# Patient Record
Sex: MALE | Race: WHITE | Employment: FULL TIME | URBAN - NONMETROPOLITAN AREA
[De-identification: names, ages, dates, MRNs, and addresses within clinical notes are randomized per-mention and may not be internally consistent; named-entity substitution may affect disease eponyms.]

---

## 2017-08-02 ENCOUNTER — OFFICE VISIT (OUTPATIENT)
Dept: FAMILY MEDICINE | Facility: CLINIC | Age: 47
End: 2017-08-02
Payer: COMMERCIAL

## 2017-08-02 ENCOUNTER — RADIANT APPOINTMENT (OUTPATIENT)
Dept: GENERAL RADIOLOGY | Facility: CLINIC | Age: 47
End: 2017-08-02
Attending: NURSE PRACTITIONER
Payer: COMMERCIAL

## 2017-08-02 VITALS
WEIGHT: 178.8 LBS | HEIGHT: 69 IN | RESPIRATION RATE: 18 BRPM | DIASTOLIC BLOOD PRESSURE: 74 MMHG | TEMPERATURE: 96.7 F | BODY MASS INDEX: 26.48 KG/M2 | SYSTOLIC BLOOD PRESSURE: 124 MMHG | OXYGEN SATURATION: 99 % | HEART RATE: 67 BPM

## 2017-08-02 DIAGNOSIS — Z23 NEED FOR TDAP VACCINATION: ICD-10-CM

## 2017-08-02 DIAGNOSIS — M25.511 PAIN OF RIGHT SHOULDER JOINT ON MOVEMENT: ICD-10-CM

## 2017-08-02 DIAGNOSIS — M25.511 PAIN OF RIGHT SHOULDER JOINT ON MOVEMENT: Primary | ICD-10-CM

## 2017-08-02 PROCEDURE — 90471 IMMUNIZATION ADMIN: CPT | Performed by: NURSE PRACTITIONER

## 2017-08-02 PROCEDURE — 99213 OFFICE O/P EST LOW 20 MIN: CPT | Mod: 25 | Performed by: NURSE PRACTITIONER

## 2017-08-02 PROCEDURE — 73030 X-RAY EXAM OF SHOULDER: CPT | Mod: RT

## 2017-08-02 PROCEDURE — 90715 TDAP VACCINE 7 YRS/> IM: CPT | Performed by: NURSE PRACTITIONER

## 2017-08-02 ASSESSMENT — PAIN SCALES - GENERAL: PAINLEVEL: WORST PAIN (10)

## 2017-08-02 NOTE — PATIENT INSTRUCTIONS
We will call you with the results of your X rays    Ice to shoulder 20 minutes, several times daily    Ibuprofen 600 mg orally every 6 hours as needed for pain.    Try Icy -Hot or Aspercream to shoulder. If no pain relief, then try the Diclofenac gel.        Your clinic record indicates that you are due for:  complete physical exam and fasting labs  Need to be fasting for 10 hrs so just coming in before eating breakfast is the easiest.

## 2017-08-02 NOTE — PROGRESS NOTES
"  SUBJECTIVE:                                                    Jacques Morales is a 46 year old male who presents to clinic today for the following health issues:      Musculoskeletal problem/pain      Duration: 4 weeks     Description  Location: right    Intensity:  Severe pain after working    Accompanying signs and symptoms: none    History  Previous similar problem: no   Previous evaluation:  none    Precipitating or alleviating factors:  Trauma or overuse: no   Aggravating factors include: lifting and overuse    Therapies tried and outcome: NSAID - IBU was not helpful    Had been present for about a month but worsened 6 days ago.   One week ago took out and down to bed. Started hurting a few hours before finishing.  Pain Has improved some since last thurs, pain remains  Favors right arm  Can't sleep on right side.   Localized to shoulder, no neuropathy or myalgia   Ibuprofen helped a little. No tylenol, no ice or heat tried.  Has pain with rotation.      Problem list and histories reviewed & adjusted, as indicated.  Additional history: as documented    Reviewed and updated as needed this visit by clinical staffTobacco  Allergies  Meds  Problems       Reviewed and updated as needed this visit by Provider  Allergies  Meds  Problems         ROS:  Constitutional, HEENT, cardiovascular, pulmonary, gi and gu systems are negative, except as otherwise noted.      OBJECTIVE:   /74 (BP Location: Left arm, Patient Position: Chair, Cuff Size: Adult Regular)  Pulse 67  Temp 96.7  F (35.9  C) (Tympanic)  Resp 18  Ht 5' 9\" (1.753 m)  Wt 178 lb 12.8 oz (81.1 kg)  SpO2 99%  BMI 26.4 kg/m2  Body mass index is 26.4 kg/(m^2).  GENERAL: healthy, alert and no distress  NECK: no adenopathy, no asymmetry, masses, or scars and thyroid normal to palpation  RESP: lungs clear to auscultation - no rales, rhonchi or wheezes  CV: regular rate and rhythm, normal S1 S2, no S3 or S4, no murmur, click or rub, no " peripheral edema and peripheral pulses strong  MS: decreased range of motion right shoulder and tenderness to palpation right shoulder. Pain with posterior rotation  Diagnostic Test Results:  none     ASSESSMENT/PLAN:     (M25.462) Pain of right shoulder joint on movement  Comment: We'll obtain film and refer to physical therapy.  Plan: XR Shoulder Right G/E 3 Views, diclofenac         (VOLTAREN) 1 % GEL topical gel, PHYSICAL         THERAPY REFERRAL            (Z23) Need for Tdap vaccination  (primary encounter diagnosis)  Comment:   Plan: TDAP VACCINE (ADACEL), ADMIN 1st VACCINE            Patient Instructions   We will call you with the results of your X rays    Ice to shoulder 20 minutes, several times daily    Ibuprofen 600 mg orally every 6 hours as needed for pain.    Try Icy -Hot or Aspercream to shoulder. If no pain relief, then try the Diclofenac gel.        Your clinic record indicates that you are due for:  complete physical exam and fasting labs  Need to be fasting for 10 hrs so just coming in before eating breakfast is the easiest.         Breanna Buenrostro, MAGUI, APRN CNP  [facility] [place]

## 2017-08-02 NOTE — MR AVS SNAPSHOT
After Visit Summary   8/2/2017    Jacques Morales    MRN: [id number]           Patient Information     Date Of Birth          1970        Visit Information        Provider Department      8/2/2017 7:40 AM Breanna Buenrostro APRN [facility]        Today's Diagnoses     Need for Tdap vaccination    -  1    Pain of right shoulder joint on movement          Care Instructions    We will call you with the results of your X rays    Ice to shoulder 20 minutes, several times daily    Ibuprofen 600 mg orally every 6 hours as needed for pain.    Try Icy -Hot or Aspercream to shoulder. If no pain relief, then try the Diclofenac gel.        Your clinic record indicates that you are due for:  complete physical exam and fasting labs  Need to be fasting for 10 hrs so just coming in before eating breakfast is the easiest.             Follow-ups after your visit        Additional Services     PHYSICAL THERAPY REFERRAL       *This order will print in the [facility] Central Scheduling Office*    [facility] provides Physical Therapy evaluation and treatment and many specialty services across the [facility] system.  If requesting a specialty program, please choose from the list below.    Call one number to schedule at any [facility] location   [phone].    Treatment: Evaluation & Treatment  Special Instructions/Modalities: none. Patient with right shoulder pain, no known injury. X ray negative for fracture. ROM limited.   Special Programs: None    Please be aware that coverage of these services is subject to the terms and limitations of your health insurance plan.  Call member services at your health plan with any benefit or coverage questions.      **Note to Provider** To refer patients to therapy outside of the location list, change the order class to External Referral in the order composer.                  Who to contact     If  "you have questions or need follow up information about today's clinic visit or your schedule please contact [facility] [place] directly at [phone].  Normal or non-critical lab and imaging results will be communicated to you by MyChart, letter or phone within 4 business days after the clinic has received the results. If you do not hear from us within 7 days, please contact the clinic through [employer] or phone. If you have a critical or abnormal lab result, we will notify you by phone as soon as possible.  Submit refill requests through [employer] or call your pharmacy and they will forward the refill request to us. Please allow 3 business days for your refill to be completed.          Additional Information About Your Visit        [employer] Information     [employer] lets you send messages to your doctor, view your test results, renew your prescriptions, schedule appointments and more. To sign up, go to [email or web address] . Click on \"Log in\" on the left side of the screen, which will take you to the Welcome page. Then click on \"Sign up Now\" on the right side of the page.     You will be asked to enter the access code listed below, as well as some personal information. Please follow the directions to create your username and password.     Your access code is: [email or web address]  Expires: 10/31/2017  8:59 AM     Your access code will  in 90 days. If you need help or a new code, please call your [facility] clinic or [phone].        Care EveryWhere ID     This is your Care EveryWhere ID. This could be used by other organizations to access your [facility] medical records  [id number]        Your Vitals Were     Pulse Temperature Respirations Height Pulse Oximetry BMI (Body Mass Index)    67 96.7  F (35.9  C) (Tympanic) 18 5' 9\" (1.753 m) 99% 26.4 kg/m2       Blood Pressure from Last 3 Encounters:   17 124/74   16 130/78   14 108/70    Weight from Last 3 Encounters:   17 178 lb 12.8 oz " (81.1 kg)   09/08/16 180 lb (81.6 kg)   02/06/14 186 lb (84.4 kg)              We Performed the Following     ADMIN 1st VACCINE     PHYSICAL THERAPY REFERRAL     TDAP VACCINE (ADACEL)          Today's Medication Changes          These changes are accurate as of: 8/2/17  9:00 AM.  If you have any questions, ask your nurse or doctor.               Start taking these medicines.        Dose/Directions    diclofenac 1 % Gel topical gel   Commonly known as:  VOLTAREN   Used for:  Pain of right shoulder joint on movement   Started by:  Breanna Buenrostro APRN CNP        Apply 4 grams to right shoulder four times daily using enclosed dosing card.   Quantity:  100 g   Refills:  1            Where to get your medicines      These medications were sent to [facility] [address] [address]     Phone:  [phone]     diclofenac 1 % Gel topical gel                Primary Care Provider Office Phone # Fax #    SHITAL Bang [phone]       [facility] [address] [place] [address]        Equal Access to Services     MILTON SHARPE : Hadii aad ku hadasho Soomaali, waaxda luqadaha, qaybta kaalmada adeegyada, waxay sita sanchez . So [facility] [phone].    ATENCIÓN: Si habla español, tiene a orona disposición servicios gratuitos de asistencia lingüística. Tanisha al [phone].    We comply with applicable federal civil rights laws and [place] laws. We do not discriminate on the basis of race, color, national origin, age, disability sex, sexual orientation or gender identity.            Thank you!     Thank you for choosing [facility] [place]  for your care. Our goal is always to provide you with excellent care. Hearing back from our patients is one way we can continue to improve our services. Please take a few minutes to complete the written survey that you may receive in the mail after your  visit with us. Thank you!             Your Updated Medication List - Protect others around you: Learn how to safely use, store and throw away your medicines at www.disposemymeds.org.          This list is accurate as of: 8/2/17  9:00 AM.  Always use your most recent med list.                   Brand Name Dispense Instructions for use Diagnosis    diclofenac 1 % Gel topical gel    VOLTAREN    100 g    Apply 4 grams to right shoulder four times daily using enclosed dosing card.    Pain of right shoulder joint on movement

## 2017-08-02 NOTE — NURSING NOTE
"Chief Complaint   Patient presents with     Shoulder Pain       Initial There were no vitals taken for this visit. Estimated body mass index is 26.2 kg/(m^2) as calculated from the following:    Height as of 9/8/16: 5' 9.5\" (1.765 m).    Weight as of 9/8/16: 180 lb (81.6 kg).  Medication Reconciliation: complete      Health Maintenance that is potentially due pending provider review:  NONE    n/a  "

## 2018-05-19 ENCOUNTER — HOSPITAL ENCOUNTER (EMERGENCY)
Facility: CLINIC | Age: 48
Discharge: HOME | End: 2018-05-19
Attending: EMERGENCY MEDICINE | Admitting: EMERGENCY MEDICINE
Payer: COMMERCIAL

## 2018-05-19 VITALS
HEIGHT: 70 IN | SYSTOLIC BLOOD PRESSURE: 128 MMHG | BODY MASS INDEX: 26.48 KG/M2 | TEMPERATURE: 98.9 F | WEIGHT: 185 LBS | HEART RATE: 86 BPM | RESPIRATION RATE: 18 BRPM | OXYGEN SATURATION: 98 % | DIASTOLIC BLOOD PRESSURE: 85 MMHG

## 2018-05-19 DIAGNOSIS — S69.92XA FISH HOOK INJURY OF FINGER OF LEFT HAND, INITIAL ENCOUNTER: ICD-10-CM

## 2018-05-19 PROCEDURE — 99283 EMERGENCY DEPT VISIT LOW MDM: CPT | Mod: 25 | Performed by: EMERGENCY MEDICINE

## 2018-05-19 PROCEDURE — 10120 INC&RMVL FB SUBQ TISS SMPL: CPT | Mod: Z6 | Performed by: EMERGENCY MEDICINE

## 2018-05-19 PROCEDURE — 10120 INC&RMVL FB SUBQ TISS SMPL: CPT | Performed by: EMERGENCY MEDICINE

## 2018-05-19 PROCEDURE — 99282 EMERGENCY DEPT VISIT SF MDM: CPT | Mod: 25 | Performed by: EMERGENCY MEDICINE

## 2018-05-19 ASSESSMENT — ENCOUNTER SYMPTOMS: WOUND: 1

## 2018-05-19 NOTE — ED PROVIDER NOTES
"  History     Chief Complaint   Patient presents with     Foreign Body in Skin     fish hook L index finger.  tetanus UTD per pt     HPI  Jacques Morales is a 47 year old male who presents to the ED for evaluation of foreign body in the skin. Patient was fishing this morning and got a fish hook stuck in his left index finger at the cuticle. Patient cut the other hooks off of the one stuck in his finger and attempted to pull the hook out. He states that he pulled on the hook relatively hard and was unable to get the hook out. His TDAP is up to date. He has no known drug allergies.     Problem List:    Patient Active Problem List    Diagnosis Date Noted     Plantar fasciitis 09/08/2016     Priority: Medium     CARDIOVASCULAR SCREENING; LDL GOAL LESS THAN 160 10/31/2010     Priority: Medium        Past Medical History:    No past medical history on file.    Past Surgical History:    No past surgical history on file.    Family History:    No family history on file.    Social History:  Marital Status:  Single [1]  Social History   Substance Use Topics     Smoking status: Current Every Day Smoker     Packs/day: 1.00     Types: Cigarettes     Smokeless tobacco: Never Used     Alcohol use Yes      Comment: rare        Medications:      diclofenac (VOLTAREN) 1 % GEL topical gel         Review of Systems   Skin: Positive for wound.   All other systems reviewed and are negative.      Physical Exam   BP: 128/85  Pulse: 86  Temp: 98.9  F (37.2  C)  Resp: 18  Height: 177.8 cm (5' 10\")  Weight: 83.9 kg (185 lb)  SpO2: 98 %      Physical Exam    /85  Pulse 86  Temp 98.9  F (37.2  C) (Temporal)  Resp 18  Ht 1.778 m (5' 10\")  Wt 83.9 kg (185 lb)  SpO2 98%  BMI 26.54 kg/m2  /85  Pulse 86  Temp 98.9  F (37.2  C) (Temporal)  Resp 18  Ht 1.778 m (5' 10\")  Wt 83.9 kg (185 lb)  SpO2 98%  BMI 26.54 kg/m2  General: alert and in no acute distress  Head: atraumatic, normocephalic  Abd: Soft, nontender, nondistended, no " peritoneal signs  Left index finger with fishhook that is that the Musculoskel/Extremities: Base of the left fingernail, with a triple barbed lower, however to Flory's have been cut off by the patient prior to arrival.  Slight amounts of bleeding secondary to patient's previous attempts at removing the hook.  Neuro: Patient awake, alert, oriented, speech is fluent, gait is normal  Psychiatric: affect/mood normal, cooperative, normal judgement/insight and memory intact          ED Course     ED Course     Procedures               Critical Care time:  none               No results found for this or any previous visit (from the past 24 hour(s)).    Medications - No data to display    9:22 AM Patient assessed     Assessments & Plan (with Medical Decision Making)  47 year old male, presenting to the emergency department with fishhook injury to the base of the left fingernail.  This occurred shortly prior to arrival.  Patient reports his tetanus is up-to-date.  He attempted to remove the hook, however was unsuccessful.  Patient had significant pain with further attempts, and therefore presented to the emergency department.  Digital block was performed with 5 cc of 1% lidocaine without epinephrine, and patient had good anesthesia of the left second digit.  Subsequently, needle  was utilized and fishhook was removed from the area where the fissure cath inserted.  Patient tolerated procedure well.  Area was cleansed, and triple antibiotic ointment applied.  Patient counseled on wound care instructions, and follow-up in clinic or be seen if any signs of infection develop.     I have reviewed the nursing notes.    I have reviewed the findings, diagnosis, plan and need for follow up with the patient.       New Prescriptions    No medications on file       Final diagnoses:   Fish hook injury of finger of left hand, initial encounter     This document serves as a record of the services and decisions personally performed and  made by Danny Bryson MD. It was created on HIS/HER behalf by   Shi Crystal, a trained medical scribe. The creation of this document is based the provider's statements to the medical scribe.  Shi Crystal 9:22 AM 5/19/2018    Provider:   The information in this document, created by the medical scribe for me, accurately reflects the services I personally performed and the decisions made by me. I have reviewed and approved this document for accuracy prior to leaving the patient care area.  Danny Bryson MD 9:22 AM 5/19/2018 5/19/2018   [facility] EMERGENCY DEPARTMENT     Danny Bryson MD  05/19/18 1016

## 2018-05-19 NOTE — ED AVS SNAPSHOT
[facility] Emergency Department    [address] [facility] [address]    Phone:  [phone]    Fax:  [phone]                                       Jacques Morales   MRN: [id number]    Department:  [facility] Emergency Department   Date of Visit:  5/19/2018           After Visit Summary Signature Page     I have received my discharge instructions, and my questions have been answered. I have discussed any challenges I see with this plan with the nurse or doctor.    ..........................................................................................................................................  Patient/Patient Representative Signature      ..........................................................................................................................................  Patient Representative Print Name and Relationship to Patient    ..................................................               ................................................  Date                                            Time    ..........................................................................................................................................  Reviewed by Signature/Title    ...................................................              ..............................................  Date                                                            Time

## 2018-05-19 NOTE — ED AVS SNAPSHOT
[facility] Emergency Department    [address] [facility] [address] [facility] [address]    Phone:  [phone]    Fax:  [phone]                                       Jacques Morales   MRN: [id number]    Department:  [facility] Emergency Department   Date of Visit:  5/19/2018           Patient Information     Date Of Birth          1970        Your diagnoses for this visit were:     Fish hook injury of finger of left hand, initial encounter        You were seen by Danny Bryson MD.        Discharge Instructions       Recommend soaking the hand/finger at least a couple times daily.  Apply antibiotic ointment twice daily.    Be seen if signs of infection develop including pus, or spreading redness of the finger.      Fish Hook Removed  A fish hook has been removed from under your skin. This area may be sore for the next 1 to 2 days. Because this was a dirty puncture-type wound, the risk of infection is higher than normal. Antibiotics may or may not be prescribed depending on various factors such as depth, severity, and location. You may be given a tetanus shot if needed.  Home care  Your healthcare provider will give you instructions on how to care for your wound. These will depend on where the wound is and how serious it is. The following may help you care for your wound at home:    Keep the injured part elevated during the first 2 days. This will help reduce swelling and pain.    Keep the wound clean and dry. If a bandage was applied and it becomes wet or dirty, replace it. Otherwise, leave it in place for the first 24 hours.    Shower as usual, unless your healthcare provider tells you otherwise.    Don't soak in a tub or go swimming for at least 1 week or as instructed by your doctor.    Don't soak the injured area unless your doctor tells you to.    Use acetaminophen or ibuprofen to control pain, unless another pain medicine was prescribed. If you have chronic liver or kidney disease or ever had a  stomach ulcer or GI bleeding, talk with your doctor before using these medicines.  Follow-up care  Most puncture wounds heal within 10 days. However, an infection may sometimes occur despite proper treatment. Check the wound daily for the warning signs listed below.  If stitches were used, they should be removed within 7 to 10 days. If a tape closure was used, remove them after 5 days unless told otherwise.  If any X-rays were taken, a radiologist will look at them. You will be notified if there are new findings that may affect your care.  When to seek medical advice  Call your healthcare provider if any of these occur:    Increasing pain in the wound    Redness, swelling or pus coming from the wound    Fever of 100.4 F (38 C) or higher, or as directed by your healthcare provider  Date Last Reviewed: 10/1/2016    7100-3570 [employer]. [address] [place] [address]. All rights reserved. This information is not intended as a substitute for professional medical care. Always follow your healthcare professional's instructions.          24 Hour Appointment Hotline       To make an appointment at any [facility], call [phone] ([phone]). If you don't have a family doctor or clinic, we will help you find one. [facility] clinics are conveniently located to serve the needs of you and your family.             Review of your medicines      Our records show that you are taking the medicines listed below. If these are incorrect, please call your family doctor or clinic.        Dose / Directions Last dose taken    diclofenac 1 % Gel topical gel   Commonly known as:  VOLTAREN   Quantity:  100 g        Apply 4 grams to right shoulder four times daily using enclosed dosing card.   Refills:  1                Orders Needing Specimen Collection     None      Pending Results     No orders found from 5/17/2018 to 5/20/2018.            Pending Culture Results     No orders found from 5/17/2018 to  "2018.            Pending Results Instructions     If you had any lab results that were not finalized at the time of your Discharge, you can call the ED Lab Result RN at [phone]. You will be contacted by this team for any positive Lab results or changes in treatment. The nurses are available 7 days a week from 10A to 6:30P.  You can leave a message 24 hours per day and they will return your call.        Test Results From Your Hospital Stay               Thank you for choosing [facility]       Thank you for choosing [facility] for your care. Our goal is always to provide you with excellent care. Hearing back from our patients is one way we can continue to improve our services. Please take a few minutes to complete the written survey that you may receive in the mail after you visit with us. Thank you!        [employer] Information     [employer] lets you send messages to your doctor, view your test results, renew your prescriptions, schedule appointments and more. To sign up, go to [email or web address] . Click on \"Log in\" on the left side of the screen, which will take you to the Welcome page. Then click on \"Sign up Now\" on the right side of the page.     You will be asked to enter the access code listed below, as well as some personal information. Please follow the directions to create your username and password.     Your access code is: [email or web address]  Expires: 2018 10:02 AM     Your access code will  in 90 days. If you need help or a new code, please call your [facility] clinic or [phone].        Care EveryWhere ID     This is your Care EveryWhere ID. This could be used by other organizations to access your [facility] medical records  [id number]        Equal Access to Services     ROSA SHARPE : jennifer Centeno, myrna barnes. So [facility] [phone].    ATENCIÓN: Si habla español, tiene a orona disposición servicios " jillian de asistencia lingüística. Tanisha richard [phone].    We comply with applicable federal civil rights laws and [place] laws. We do not discriminate on the basis of race, color, national origin, age, disability, sex, sexual orientation, or gender identity.            After Visit Summary       This is your record. Keep this with you and show to your community pharmacist(s) and doctor(s) at your next visit.

## 2020-01-01 ENCOUNTER — APPOINTMENT (OUTPATIENT)
Dept: CT IMAGING | Facility: CLINIC | Age: 50
End: 2020-01-01
Attending: EMERGENCY MEDICINE
Payer: COMMERCIAL

## 2020-01-01 ENCOUNTER — HOSPITAL ENCOUNTER (EMERGENCY)
Facility: CLINIC | Age: 50
Discharge: HOME | End: 2020-01-01
Attending: EMERGENCY MEDICINE | Admitting: EMERGENCY MEDICINE
Payer: COMMERCIAL

## 2020-01-01 VITALS
BODY MASS INDEX: 24.39 KG/M2 | HEART RATE: 60 BPM | TEMPERATURE: 98.1 F | SYSTOLIC BLOOD PRESSURE: 176 MMHG | DIASTOLIC BLOOD PRESSURE: 93 MMHG | WEIGHT: 170 LBS | OXYGEN SATURATION: 97 % | RESPIRATION RATE: 18 BRPM

## 2020-01-01 DIAGNOSIS — R10.32 ABDOMINAL PAIN, LEFT LOWER QUADRANT: ICD-10-CM

## 2020-01-01 LAB
ALBUMIN SERPL-MCNC: 4.3 G/DL (ref 3.4–5)
ALBUMIN UR-MCNC: NEGATIVE MG/DL
ALP SERPL-CCNC: 76 U/L (ref 40–150)
ALT SERPL W P-5'-P-CCNC: 40 U/L (ref 0–70)
ANION GAP SERPL CALCULATED.3IONS-SCNC: 6 MMOL/L (ref 3–14)
APPEARANCE UR: CLEAR
AST SERPL W P-5'-P-CCNC: 18 U/L (ref 0–45)
BASOPHILS # BLD AUTO: 0.1 10E9/L (ref 0–0.2)
BASOPHILS NFR BLD AUTO: 0.4 %
BILIRUB SERPL-MCNC: 0.5 MG/DL (ref 0.2–1.3)
BILIRUB UR QL STRIP: NEGATIVE
BUN SERPL-MCNC: 13 MG/DL (ref 7–30)
CALCIUM SERPL-MCNC: 9.5 MG/DL (ref 8.5–10.1)
CHLORIDE SERPL-SCNC: 102 MMOL/L (ref 94–109)
CO2 SERPL-SCNC: 27 MMOL/L (ref 20–32)
COLOR UR AUTO: NORMAL
CREAT SERPL-MCNC: 0.76 MG/DL (ref 0.66–1.25)
DIFFERENTIAL METHOD BLD: ABNORMAL
EOSINOPHIL # BLD AUTO: 0.1 10E9/L (ref 0–0.7)
EOSINOPHIL NFR BLD AUTO: 0.6 %
ERYTHROCYTE [DISTWIDTH] IN BLOOD BY AUTOMATED COUNT: 11.7 % (ref 10–15)
GFR SERPL CREATININE-BSD FRML MDRD: >90 ML/MIN/{1.73_M2}
GLUCOSE SERPL-MCNC: 145 MG/DL (ref 70–99)
GLUCOSE UR STRIP-MCNC: NEGATIVE MG/DL
HCT VFR BLD AUTO: 49.9 % (ref 40–53)
HGB BLD-MCNC: 16.8 G/DL (ref 13.3–17.7)
HGB UR QL STRIP: NEGATIVE
IMM GRANULOCYTES # BLD: 0.1 10E9/L (ref 0–0.4)
IMM GRANULOCYTES NFR BLD: 0.4 %
KETONES UR STRIP-MCNC: NEGATIVE MG/DL
LEUKOCYTE ESTERASE UR QL STRIP: NEGATIVE
LIPASE SERPL-CCNC: 80 U/L (ref 73–393)
LYMPHOCYTES # BLD AUTO: 1.6 10E9/L (ref 0.8–5.3)
LYMPHOCYTES NFR BLD AUTO: 13.9 %
MCH RBC QN AUTO: 31.6 PG (ref 26.5–33)
MCHC RBC AUTO-ENTMCNC: 33.7 G/DL (ref 31.5–36.5)
MCV RBC AUTO: 94 FL (ref 78–100)
MONOCYTES # BLD AUTO: 0.7 10E9/L (ref 0–1.3)
MONOCYTES NFR BLD AUTO: 6.3 %
NEUTROPHILS # BLD AUTO: 9 10E9/L (ref 1.6–8.3)
NEUTROPHILS NFR BLD AUTO: 78.4 %
NITRATE UR QL: NEGATIVE
NRBC # BLD AUTO: 0 10*3/UL
NRBC BLD AUTO-RTO: 0 /100
PH UR STRIP: 6 PH (ref 5–7)
PLATELET # BLD AUTO: 237 10E9/L (ref 150–450)
POTASSIUM SERPL-SCNC: 4.7 MMOL/L (ref 3.4–5.3)
PROT SERPL-MCNC: 7.7 G/DL (ref 6.8–8.8)
RBC # BLD AUTO: 5.32 10E12/L (ref 4.4–5.9)
SODIUM SERPL-SCNC: 135 MMOL/L (ref 133–144)
SOURCE: NORMAL
SP GR UR STRIP: 1.03 (ref 1–1.03)
UROBILINOGEN UR STRIP-MCNC: 0 MG/DL (ref 0–2)
WBC # BLD AUTO: 11.5 10E9/L (ref 4–11)

## 2020-01-01 PROCEDURE — 99285 EMERGENCY DEPT VISIT HI MDM: CPT | Mod: 25 | Performed by: EMERGENCY MEDICINE

## 2020-01-01 PROCEDURE — 25800030 ZZH RX IP 258 OP 636: Performed by: EMERGENCY MEDICINE

## 2020-01-01 PROCEDURE — 96361 HYDRATE IV INFUSION ADD-ON: CPT | Performed by: EMERGENCY MEDICINE

## 2020-01-01 PROCEDURE — 25000128 H RX IP 250 OP 636: Performed by: EMERGENCY MEDICINE

## 2020-01-01 PROCEDURE — 99284 EMERGENCY DEPT VISIT MOD MDM: CPT | Mod: Z6 | Performed by: EMERGENCY MEDICINE

## 2020-01-01 PROCEDURE — 80053 COMPREHEN METABOLIC PANEL: CPT | Performed by: EMERGENCY MEDICINE

## 2020-01-01 PROCEDURE — 85025 COMPLETE CBC W/AUTO DIFF WBC: CPT | Performed by: EMERGENCY MEDICINE

## 2020-01-01 PROCEDURE — 25000125 ZZHC RX 250: Performed by: EMERGENCY MEDICINE

## 2020-01-01 PROCEDURE — 81003 URINALYSIS AUTO W/O SCOPE: CPT | Performed by: EMERGENCY MEDICINE

## 2020-01-01 PROCEDURE — 96374 THER/PROPH/DIAG INJ IV PUSH: CPT | Mod: 59 | Performed by: EMERGENCY MEDICINE

## 2020-01-01 PROCEDURE — 74177 CT ABD & PELVIS W/CONTRAST: CPT

## 2020-01-01 PROCEDURE — 83690 ASSAY OF LIPASE: CPT | Performed by: EMERGENCY MEDICINE

## 2020-01-01 RX ORDER — KETOROLAC TROMETHAMINE 15 MG/ML
15 INJECTION, SOLUTION INTRAMUSCULAR; INTRAVENOUS ONCE
Status: COMPLETED | OUTPATIENT
Start: 2020-01-01 | End: 2020-01-01

## 2020-01-01 RX ORDER — IOPAMIDOL 755 MG/ML
83 INJECTION, SOLUTION INTRAVASCULAR ONCE
Status: COMPLETED | OUTPATIENT
Start: 2020-01-01 | End: 2020-01-01

## 2020-01-01 RX ADMIN — SODIUM CHLORIDE 61 ML: 9 INJECTION, SOLUTION INTRAVENOUS at 21:31

## 2020-01-01 RX ADMIN — IOPAMIDOL 83 ML: 755 INJECTION, SOLUTION INTRAVENOUS at 21:30

## 2020-01-01 RX ADMIN — SODIUM CHLORIDE 1000 ML: 9 INJECTION, SOLUTION INTRAVENOUS at 21:16

## 2020-01-01 RX ADMIN — KETOROLAC TROMETHAMINE 15 MG: 15 INJECTION, SOLUTION INTRAMUSCULAR; INTRAVENOUS at 21:16

## 2020-01-01 NOTE — ED AVS SNAPSHOT
[facility]  [address] [facility] [address]  Phone:  [phone]  Fax:  [phone]                                    Jacques Morales   MRN: [id number]    Department:  [facility]   Date of Visit:  1/1/2020           After Visit Summary Signature Page    I have received my discharge instructions, and my questions have been answered. I have discussed any challenges I see with this plan with the nurse or doctor.    ..........................................................................................................................................  Patient/Patient Representative Signature      ..........................................................................................................................................  Patient Representative Print Name and Relationship to Patient    ..................................................               ................................................  Date                                   Time    ..........................................................................................................................................  Reviewed by Signature/Title    ...................................................              ..............................................  Date                                               Time          22EPIC Rev 08/18

## 2020-01-02 NOTE — DISCHARGE INSTRUCTIONS
Probable diverticulitis, Augmentin as prescribed    Tylenol ibuprofen for discomfort    Return here for worsening pain, fever, vomiting or any other concern

## 2020-01-02 NOTE — ED PROVIDER NOTES
History     Chief Complaint   Patient presents with     Abdominal Pain     started yesterday, beltline, more left > right. Indigestion. heartburn.      HPI  Jacques Morales is a 49 year old male who presents for lower abdominal pain that began over the last 2 days, awoke him yesterday described as an aching pressure sensation worse with movement.  He has nausea without vomiting.  Describes anorexia minimal food intake last at 1700 today.  No prior such discomfort.  No previous colonoscopy.  Denies associated fever.  Describes bowel movements as loose x5 today without black or bloody component.  He is not tried any meds. denies urinary symptoms.  No prior abdominal surgery.  1-2 beers daily, smokes 1 to 1-1/2 pack cigarettes daily.    Allergies:  No Known Allergies    Problem List:    Patient Active Problem List    Diagnosis Date Noted     Plantar fasciitis 09/08/2016     Priority: Medium     CARDIOVASCULAR SCREENING; LDL GOAL LESS THAN 160 10/31/2010     Priority: Medium        Past Medical History:    No past medical history on file.    Past Surgical History:    No past surgical history on file.    Family History:    No family history on file.    Social History:  Marital Status:  Single [1]  Social History     Tobacco Use     Smoking status: Current Every Day Smoker     Packs/day: 1.00     Types: Cigarettes     Smokeless tobacco: Never Used   Substance Use Topics     Alcohol use: Yes     Comment: rare     Drug use: No        Medications:    amoxicillin-clavulanate (AUGMENTIN) 875-125 MG tablet  diclofenac (VOLTAREN) 1 % GEL topical gel          Review of Systems  All other systems reviewed and are negative.    Physical Exam   BP: (!) 176/93  Pulse: 60  Temp: 98.1  F (36.7  C)  Resp: 18  Weight: 77.1 kg (170 lb)  SpO2: 99 %      Physical Exam  Nontoxic appearing no respiratory distress alert and oriented ×3  Head atraumatic normocephalic  Neck supple full active painless range of motion  Lungs clear to  auscultation  Heart regular no murmur  Abdomen soft moderate tenderness left lower quadrant, suprapubic, right lower quadrant with voluntary guarding no rebound bowel sounds positive no masses or HSM  Strength and sensation grossly intact throughout the extremities, gait and station normal  Speech is fluent, good eye contact, thought processes are rational  Lower extremities without swelling, redness or tenderness  Pedal pulses symmetrical and strong    ED Course        Procedures               Critical Care time:  none               Results for orders placed or performed during the hospital encounter of 01/01/20 (from the past 24 hour(s))   CBC with platelets differential   Result Value Ref Range    WBC 11.5 (H) 4.0 - 11.0 10e9/L    RBC Count 5.32 4.4 - 5.9 10e12/L    Hemoglobin 16.8 13.3 - 17.7 g/dL    Hematocrit 49.9 40.0 - 53.0 %    MCV 94 78 - 100 fl    MCH 31.6 26.5 - 33.0 pg    MCHC 33.7 31.5 - 36.5 g/dL    RDW 11.7 10.0 - 15.0 %    Platelet Count 237 150 - 450 10e9/L    Diff Method Automated Method     % Neutrophils 78.4 %    % Lymphocytes 13.9 %    % Monocytes 6.3 %    % Eosinophils 0.6 %    % Basophils 0.4 %    % Immature Granulocytes 0.4 %    Nucleated RBCs 0 0 /100    Absolute Neutrophil 9.0 (H) 1.6 - 8.3 10e9/L    Absolute Lymphocytes 1.6 0.8 - 5.3 10e9/L    Absolute Monocytes 0.7 0.0 - 1.3 10e9/L    Absolute Eosinophils 0.1 0.0 - 0.7 10e9/L    Absolute Basophils 0.1 0.0 - 0.2 10e9/L    Abs Immature Granulocytes 0.1 0 - 0.4 10e9/L    Absolute Nucleated RBC 0.0    Comprehensive metabolic panel   Result Value Ref Range    Sodium 135 133 - 144 mmol/L    Potassium 4.7 3.4 - 5.3 mmol/L    Chloride 102 94 - 109 mmol/L    Carbon Dioxide 27 20 - 32 mmol/L    Anion Gap 6 3 - 14 mmol/L    Glucose 145 (H) 70 - 99 mg/dL    Urea Nitrogen 13 7 - 30 mg/dL    Creatinine 0.76 0.66 - 1.25 mg/dL    GFR Estimate >90 >60 mL/min/[1.73_m2]    GFR Estimate If Black >90 >60 mL/min/[1.73_m2]    Calcium 9.5 8.5 - 10.1 mg/dL     Bilirubin Total 0.5 0.2 - 1.3 mg/dL    Albumin 4.3 3.4 - 5.0 g/dL    Protein Total 7.7 6.8 - 8.8 g/dL    Alkaline Phosphatase 76 40 - 150 U/L    ALT 40 0 - 70 U/L    AST 18 0 - 45 U/L   Lipase   Result Value Ref Range    Lipase 80 73 - 393 U/L   CT Abdomen Pelvis w Contrast    Narrative    CT ABDOMEN PELVIS WITH CONTRAST  1/1/2020 9:40 PM    HISTORY: Abdominal pain, diverticulitis suspected. Pain since  yesterday across beltline region.    TECHNIQUE: Scans obtained from the diaphragm through the pelvis with  IV contrast, Isovue 370, 83 mL.   Radiation dose for this scan was reduced using automated exposure  control, adjustment of the mA and/or kV according to patient size, or  iterative reconstruction technique.    COMPARISON:  None.    FINDINGS: Motion artifact mildly limits evaluation of the lung bases.  Visualized portions of the lung bases and mediastinal contents are  grossly unremarkable. No aggressive osseous lesions or acute osseous  fractures are identified. There are degenerative changes in the spine.    The gallbladder is mostly decompressed and has a mildly thickened  enhancing wall. This thickened enhancing wall is likely due to  decompression. Radiopacity adjacent to the gallbladder neck likely  represents a vessel on end. No definite cholelithiasis is seen. The  liver, gallbladder, pancreas, spleen, bilateral adrenal glands, and  bilateral kidneys otherwise enhance normally. No hydronephrosis,  nephrolithiasis, hydroureter, or ureteral calculus is identified.  Urinary bladder is grossly unremarkable.    No adenopathy, free fluid or free air is seen in the peritoneal  cavity. Aorta is normal in appearance. Prostate gland is grossly  unremarkable.    The colon is grossly of normal caliber. No pericolonic inflammatory  change to suggest acute diverticulitis. Appendix is normal in  appearance and extends medially and inferiorly from the cecum. Small  bowel is of normal caliber. The stomach contains  ingested material and  air but is otherwise unremarkable.      Impression    IMPRESSION:  1. No etiology for patient's symptoms is identified. No evidence for  appendicitis, diverticulitis, urinary system calculus or obstruction,  or bowel obstruction is identified.  2. Gallbladder has a mildly thickened enhancing wall likely due to  decompression. No obvious cholelithiasis or evidence for acute  cholecystitis is seen.    ERIKA VICENTE MD   UA reflex to Microscopic   Result Value Ref Range    Color Urine Straw     Appearance Urine Clear     Glucose Urine Negative NEG^Negative mg/dL    Bilirubin Urine Negative NEG^Negative    Ketones Urine Negative NEG^Negative mg/dL    Specific Gravity Urine 1.032 1.003 - 1.035    Blood Urine Negative NEG^Negative    pH Urine 6.0 5.0 - 7.0 pH    Protein Albumin Urine Negative NEG^Negative mg/dL    Urobilinogen mg/dL 0.0 0.0 - 2.0 mg/dL    Nitrite Urine Negative NEG^Negative    Leukocyte Esterase Urine Negative NEG^Negative    Source Midstream Urine        Medications   0.9% sodium chloride BOLUS (0 mLs Intravenous Stopped 1/1/20 2223)   ketorolac (TORADOL) injection 15 mg (15 mg Intravenous Given 1/1/20 2116)   iopamidol (ISOVUE-370) solution 83 mL (83 mLs Intravenous Given 1/1/20 2130)   sodium chloride 0.9 % bag 500mL for CT scan flush use (61 mLs Intravenous Given 1/1/20 2131)       Assessments & Plan (with Medical Decision Making)  Lower left abdominal pain tenderness, usual differential considered.  Afebrile white count up slightly 11.5, blood sugar 145.  Urinalysis negative.  CT scan abdomen pelvis as above without acute finding.  Clinically consistent with diverticulitis.  Augmentin x7 days.  Fluids advance diet as tolerated.  Return criteria/follow-up reviewed     I have reviewed the nursing notes.    I have reviewed the findings, diagnosis, plan and need for follow up with the patient.          Discharge Medication List as of 1/1/2020 11:29 PM      START taking these  medications    Details   amoxicillin-clavulanate (AUGMENTIN) 875-125 MG tablet Take 1 tablet by mouth 2 times daily for 7 days, Disp-14 tablet, R-0, Local Print             Final diagnoses:   Abdominal pain, left lower quadrant       1/1/2020   [facility] EMERGENCY DEPARTMENT     Duarte Lewis MD  01/02/20 0424

## 2021-05-25 ENCOUNTER — RECORDS - HEALTHEAST (OUTPATIENT)
Dept: ADMINISTRATIVE | Facility: CLINIC | Age: 51
End: 2021-05-25

## 2024-06-26 NOTE — DISCHARGE INSTRUCTIONS
History Of Present Illness  Mikey Salinas is a 57 y.o. male presenting for Medication Problem (Patient states he has been on prednisone for two weeks which he since then has started feeling the burning sensation in hands and feet.)  .    HPI today he is accompanied by his wife who also gives history.  Patient was on prednisone when he has noticed sensitivities in his palms and sole of his feet.  He discontinued the prednisone but is having persistent symptoms.  He says is bothersome enough that he would like treatment, sometimes it keeps him up.  Not restless leg.  He denies any rashes, edema, itching.  Also request testosterone testing due to just chronic fatigue.    Past Medical History  Patient Active Problem List    Diagnosis Date Noted    Pain in joint of right shoulder 06/03/2024    Arthrosis of right acromioclavicular joint 06/03/2024    Biceps strain, right, subsequent encounter 06/03/2024    Rotator cuff strain, right, subsequent encounter 06/03/2024    Sprain of right shoulder, subsequent encounter 06/03/2024    Tear of right supraspinatus tendon 06/03/2024    Tear of biceps tendon 06/03/2024    Tear of right glenoid labrum 06/03/2024    Tear of left supraspinatus tendon 06/03/2024    Impingement of left shoulder 06/03/2024    Shoulder pain 05/30/2024    Chronic bilateral low back pain 12/19/2023    Gastroesophageal reflux disease with hiatal hernia 12/04/2023    History of pulmonary embolism 10/19/2023    History of deep venous thrombosis (DVT) of distal vein of left lower extremity 10/19/2023    Anxiety 09/19/2023    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  Apply antibiotic ointment twice daily.    Be seen if signs of infection develop including pus, or spreading redness of the finger.      Fish Hook Removed  A fish hook has been removed from under your skin. This area may be sore for the next 1 to 2 days. Because this was a dirty puncture-type wound, the risk of infection is higher than normal. Antibiotics may or may not be prescribed depending on various factors such as depth, severity, and location. You may be given a tetanus shot if needed.  Home care  Your healthcare provider will give you instructions on how to care for your wound. These will depend on where the wound is and how serious it is. The following may help you care for your wound at home:    Keep the injured part elevated during the first 2 days. This will help reduce swelling and pain.    Keep the wound clean and dry. If a bandage was applied and it becomes wet or dirty, replace it. Otherwise, leave it in place for the first 24 hours.    Shower as usual, unless your healthcare provider tells you otherwise.    Don't soak in a tub or go swimming for at least 1 week or as instructed by your doctor.    Don't soak the injured area unless your doctor tells you to.    Use acetaminophen or ibuprofen to control pain, unless another pain medicine was prescribed. If you have chronic liver or kidney disease or ever had a stomach ulcer or GI bleeding, talk with your doctor before using these medicines.  Follow-up care  Most puncture wounds heal within 10 days. However, an infection may sometimes occur despite proper treatment. Check the wound daily for the warning signs listed below.  If stitches were used, they should be removed within 7 to 10 days. If a tape closure was used, remove them after 5 days unless told otherwise.  If any X-rays were taken, a radiologist will look at them. You will be notified if there are new findings that may affect your  Take by mouth.    diclofenac sodium (Voltaren) 1 % gel gel Apply topically.    fluticasone (Flonase Allergy Relief) 50 mcg/actuation nasal spray Administer 1 spray into each nostril once daily.    L.acid/L.casei/B.bif/B.kenroy/FOS (PROBIOTIC BLEND ORAL) Take by mouth.    rivaroxaban (Xarelto) 20 mg tablet Take 1 tablet (20 mg) by mouth once daily.    [DISCONTINUED] DULoxetine (Cymbalta) 30 mg DR capsule Take 1 capsule (30 mg) by mouth once daily. Do not crush or chew.    [DISCONTINUED] ketoconazole (NIZOral) 2 % cream Apply 1 Application topically 2 times a day.    [DISCONTINUED] pantoprazole (ProtoNix) 20 mg EC tablet Take 1 tablet (20 mg) by mouth once daily.    hydrOXYzine HCL (Atarax) 25 mg tablet Take 1 tablet (25 mg) by mouth every 8 hours if needed (anxiety or insomnia).    [DISCONTINUED] predniSONE (Deltasone) 10 mg tablet Take 3 tablets daily x 5 days, then take 2 tablets daily x 5 days, then take 1 tablet daily x 5 days     No current facility-administered medications on file prior to visit.        Surgical History  He has a past surgical history that includes Knee arthroscopy w/ debridement (02/02/2015); MR angio head wo IV contrast (12/15/2016); Lipoma resection (N/A, 09/2023); and Roby tooth extraction (1982).     Social History  He reports that he has never smoked. He has never been exposed to tobacco smoke. He has never used smokeless tobacco. He reports current alcohol use of about 2.0 standard drinks of alcohol per week. He reports that he does not use drugs.    Family History  Family History   Problem Relation Name Age of Onset    Macular degeneration Mother Megha     Osteoporosis Mother Margnba     Transient ischemic attack Mother Megha     Brain Aneurysm Mother Megha     Hypertension Mother Marge     Other (Heart valve surgery) Father Ray     Other (RHEUMATIC HEART DISEASE) Father Ray     Nephrolithiasis Father Ray     Heart disease Father Ray     Hypertension Father Ray     Breast cancer Sister Peg  care.  When to seek medical advice  Call your healthcare provider if any of these occur:    Increasing pain in the wound    Redness, swelling or pus coming from the wound    Fever of 100.4 F (38 C) or higher, or as directed by your healthcare provider  Date Last Reviewed: 10/1/2016    2288-3997 [employer]. [address] [place] [address]. All rights reserved. This information is not intended as a substitute for professional medical care. Always follow your healthcare professional's instructions.         "56    Irritable bowel syndrome Sister Peg     ROGER disease Sister Peg     Hypertension Sister Peg     Cancer Sister Peg     Prostate cancer Brother      Cancer Brother Yves Salinas     Cancer Other GRAND FATHER         Allergies  Chocolate flavor, Cocoa, and Penicillins    Immunizations  Immunization History   Administered Date(s) Administered    Flu vaccine (IIV4), preservative free *Check age/dose* 09/25/2018, 10/19/2023    Flu vaccine, quadrivalent, no egg protein, age 6 month or greater (FLUCELVAX) 10/11/2021, 10/13/2022    Hepatitis A vaccine, age 19 years and greater (HAVRIX) 06/08/2022    Hepatitis A vaccine, pediatric/adolescent (HAVRIX, VAQTA) 07/03/2009, 08/03/2009    Hepatitis B vaccine, 19 yrs and under (RECOMBIVAX, ENGERIX) 07/03/2009, 08/03/2009    Influenza, injectable, MDCK, quadrivalent 11/01/2017, 10/10/2018, 12/09/2019    Influenza, seasonal, injectable 07/03/2009, 09/21/2016    MMR vaccine, subcutaneous (MMR II) 07/03/2009    Pfizer COVID-19 vaccine, bivalent, age 12 years and older (30 mcg/0.3 mL) 12/17/2022    Pfizer Gray Cap SARS-CoV-2 06/15/2022    Pfizer Purple Cap SARS-CoV-2 03/24/2021, 04/14/2021, 12/13/2021    Poliovirus vaccine, subcutaneous (IPOL) 07/03/2009    Tdap vaccine, age 7 year and older (BOOSTRIX, ADACEL) 07/03/2009, 01/30/2018, 06/08/2022    Typhoid, Parenteral 07/01/2009    Typhoid, ViCPs 06/08/2022    Zoster vaccine, recombinant, adult (SHINGRIX) 10/15/2021, 02/24/2022        ROS  Negative, except as discussed in HPI     Vitals  /80   Pulse 67   Temp 36.6 °C (97.8 °F)   Ht 1.778 m (5' 10\")   Wt 99.4 kg (219 lb 3.2 oz)   SpO2 95%   BMI 31.45 kg/m²      Physical Exam  Vitals and nursing note reviewed.   Constitutional:       General: He is not in acute distress.  Pulmonary:      Effort: Pulmonary effort is normal.   Skin:     General: Skin is warm and dry.      Capillary Refill: Capillary refill takes less than 2 seconds.      Findings: No erythema or rash. "   Neurological:      General: No focal deficit present.      Mental Status: He is alert. Mental status is at baseline.      Cranial Nerves: No cranial nerve deficit.      Sensory: No sensory deficit.         Relevant Results  Lab Results   Component Value Date    WBC 6.1 09/18/2023    WBC 8.9 09/06/2023    HGB 15.1 09/18/2023    HGB 15.4 09/06/2023    HCT 46.2 09/18/2023    HCT 47.4 09/06/2023    MCV 88.5 09/18/2023    MCV 88.9 09/06/2023     09/18/2023     09/06/2023     Lab Results   Component Value Date     09/18/2023     09/06/2023    K 4.2 09/18/2023    K 4.5 09/06/2023     (H) 09/18/2023     (H) 09/06/2023    CO2 24 09/18/2023    CO2 23 (L) 09/06/2023    BUN 12 09/18/2023    BUN 13 09/06/2023    CREATININE 1.0 09/18/2023    CREATININE 1.0 09/06/2023    CALCIUM 9.1 09/18/2023    CALCIUM 9.6 09/06/2023    PROT 6.1 09/18/2023    PROT 7.1 12/09/2022    BILITOT 0.6 09/18/2023    BILITOT 0.7 12/09/2022    ALKPHOS 78 09/18/2023    ALKPHOS SAMPLE HEMOLYZED TO BE RECOLLECTED 12/09/2022    ALT 17 09/18/2023    ALT 23 12/09/2022    AST 20 09/18/2023    AST 17 12/09/2022    GLUCOSE 99 09/18/2023    GLUCOSE 98 09/06/2023     Lab Results   Component Value Date    HGBA1C 5.4 09/24/2020     Lab Results   Component Value Date    TSH 0.53 09/24/2020      Lab Results   Component Value Date    CHOL 175 09/18/2023    TRIG 67 09/18/2023    HDL 57 09/18/2023           Assessment/Plan   Mikey was seen today for medication problem.  Diagnoses and all orders for this visit:  Skin sensation disturbance (Primary)  -     gabapentin (Neurontin) 100 mg capsule; Take 1 capsule (100 mg) by mouth 3 times a day as needed (pain/paresthesia).  Other fatigue  -     Testosterone, total and free; Future         Counseling:   Medication education:   -Education:  The patient is counseled regarding potential side-effects of any and all new medications  -Understanding:  Patient expressed understanding of  information discussed today  -Adherence:  No barriers to adherence identified    Final treatment plan is a result of shared decision making with patient.         Felipe Salcedo MD